# Patient Record
Sex: MALE | Race: WHITE
[De-identification: names, ages, dates, MRNs, and addresses within clinical notes are randomized per-mention and may not be internally consistent; named-entity substitution may affect disease eponyms.]

---

## 2022-06-05 ENCOUNTER — NON-APPOINTMENT (OUTPATIENT)
Age: 37
End: 2022-06-05

## 2022-06-16 ENCOUNTER — APPOINTMENT (OUTPATIENT)
Dept: ORTHOPEDIC SURGERY | Facility: CLINIC | Age: 37
End: 2022-06-16
Payer: COMMERCIAL

## 2022-06-16 DIAGNOSIS — Z78.9 OTHER SPECIFIED HEALTH STATUS: ICD-10-CM

## 2022-06-16 DIAGNOSIS — S92.411A DISPLACED FRACTURE OF PROXIMAL PHALANX OF RIGHT GREAT TOE, INITIAL ENCOUNTER FOR CLOSED FRACTURE: ICD-10-CM

## 2022-06-16 PROCEDURE — 99203 OFFICE O/P NEW LOW 30 MIN: CPT

## 2022-06-16 PROCEDURE — 99072 ADDL SUPL MATRL&STAF TM PHE: CPT

## 2022-06-16 NOTE — PHYSICAL EXAM
[Moderate] : moderate swelling of MTP joint/great toe [1st] : 1st [NL (40)] : plantar flexion 40 degrees [NL 30)] : inversion 30 degrees [NL (20)] : eversion 20 degrees [5___] : eversion 5[unfilled]/5 [4___] : [facility] 4[unfilled]/5 [2+] : posterior tibialis pulse: 2+ [Normal] : saphenous nerve sensation normal [] : patient ambulates without assistive device [Right] : right foot [FreeTextEntry3] : ecchymosis over IP joint of great toe [de-identified] : oblique fracture of medial and distal aspect of proximal phalanx of great toe with mild depression and intraarticular extension to IP joint [de-identified] : MTP joint DF 10 degrees [TWNoteComboBox6] : MTP joint PF 10 degrees

## 2022-06-16 NOTE — ASSESSMENT
[FreeTextEntry1] : 38 yo male presenting with oblique fracture of medial and distal aspect of proximal phalanx of great toe with mild depression and intraarticular extension to IP joint. Recommend conservative management at this time.\par -Discussed with patient that due to intraarticular extension that he will develop post-traumatic oa in IP joint and may require surgery in the future. Patient understands\par -RLE WBAT\par -Activities as tolerated, avoid strenuous/impact related activities at this time\par -Rest, ice, NSAIDs PRN for pain\par -All questions answered\par -F/u 6 weeks with repeat X-rays\par
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
## 2022-08-11 ENCOUNTER — APPOINTMENT (OUTPATIENT)
Dept: ORTHOPEDIC SURGERY | Facility: CLINIC | Age: 37
End: 2022-08-11

## 2022-10-06 ENCOUNTER — APPOINTMENT (OUTPATIENT)
Dept: ORTHOPEDIC SURGERY | Facility: CLINIC | Age: 37
End: 2022-10-06

## 2022-10-06 VITALS — WEIGHT: 190 LBS | HEIGHT: 66 IN | BODY MASS INDEX: 30.53 KG/M2

## 2022-10-06 DIAGNOSIS — Z87.891 PERSONAL HISTORY OF NICOTINE DEPENDENCE: ICD-10-CM

## 2022-10-06 DIAGNOSIS — Z78.9 OTHER SPECIFIED HEALTH STATUS: ICD-10-CM

## 2022-10-06 DIAGNOSIS — Z87.898 PERSONAL HISTORY OF OTHER SPECIFIED CONDITIONS: ICD-10-CM

## 2022-10-06 DIAGNOSIS — S92.411D DISPLACED FRACTURE OF PROXIMAL PHALANX OF RIGHT GREAT TOE, SUBSEQUENT ENCOUNTER FOR FRACTURE WITH ROUTINE HEALING: ICD-10-CM

## 2022-10-06 PROCEDURE — 99072 ADDL SUPL MATRL&STAF TM PHE: CPT

## 2022-10-06 PROCEDURE — 99213 OFFICE O/P EST LOW 20 MIN: CPT

## 2022-10-06 PROCEDURE — 73630 X-RAY EXAM OF FOOT: CPT | Mod: RT

## 2022-10-06 NOTE — ASSESSMENT
[FreeTextEntry1] : 36 yo male presenting for f/u of oblique fracture of medial and distal aspect of proximal phalanx of great toe with mild depression and intraarticular extension to IP joint. 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 Patient understands\par -RLE WBAT\par -Activities as tolerated, avoid strenuous/impact related activities at this time\par -Rest, ice, NSAIDs PRN for pain\par -All questions answered\par -F/u PRN\par

## 2022-10-06 NOTE — PHYSICAL EXAM
[Moderate] : moderate swelling of MTP joint/great toe [1st] : 1st [NL (40)] : plantar flexion 40 degrees [NL 30)] : inversion 30 degrees [NL (20)] : eversion 20 degrees [5___] : eversion 5[unfilled]/5 [4___] : [facility] 4[unfilled]/5 [2+] : posterior tibialis pulse: 2+ [Normal] : saphenous nerve sensation normal [Right] : right foot [] : no pain when stressing lateral tarsal metatarsal joint [FreeTextEntry3] : ecchymosis over IP joint of great toe [de-identified] : increased callus formation at oblique fracture of medial and distal aspect of proximal phalanx of great toe with mild depression and intraarticular extension to IP joint, fracture is fully healed [de-identified] : MTP joint DF 10 degrees [TWNoteComboBox6] : MTP joint PF 10 degrees
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
